# Patient Record
Sex: MALE | Race: WHITE
[De-identification: names, ages, dates, MRNs, and addresses within clinical notes are randomized per-mention and may not be internally consistent; named-entity substitution may affect disease eponyms.]

---

## 2017-09-29 ENCOUNTER — HOSPITAL ENCOUNTER (EMERGENCY)
Dept: HOSPITAL 10 - FTE | Age: 37
Discharge: HOME | End: 2017-09-29
Payer: SELF-PAY

## 2017-09-29 VITALS
HEIGHT: 72 IN | WEIGHT: 167.55 LBS | HEIGHT: 72 IN | BODY MASS INDEX: 22.69 KG/M2 | BODY MASS INDEX: 22.69 KG/M2 | WEIGHT: 167.55 LBS

## 2017-09-29 DIAGNOSIS — S69.92XA: Primary | ICD-10-CM
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
## 2017-09-29 NOTE — ERA
ER Documentation


Chief Complaint


Date/Time


DATE: 9/29/17 


TIME: 23:11


Chief Complaint


left thumb injury hit w/ a hummer





HPI


37-year-old male presenting one day status post hammer versus left thumb.  

Patient states that the pain is 7 out of 10.  Worse when the pressure is 

applied.  Has taken ibuprofen and acetaminophen without relief.  Has used a hot 

needle for at home do it yourself electrocautery without relief.  Denies any 

other injuries or complaints.  Nursing notes have been reviewed and are 

consistent with history given.





ROS


All systems reviewed and are negative except as per history of present illness.





Medications


Home Meds


Active Scripts


Hydrocodone/Acetaminophen (Norco 5-325 Tablet) 1 Each Tablet, 1 TAB PO Q6H Y 

for PAIN, #7 TAB


   Prov:BRANDI INMAN PA-C         9/29/17


Cephalexin* (Keflex*) 500 Mg Capsule, 500 MG PO QID for 10 Days, CAP


   Prov:ELLI BRUSH DO         10/21/16


Sulfamethoxazole-Trimethoprim* (Bactrim* DS) 800-160 Mg Tab, 1 TAB PO BID for 

10 Days, TAB


   Prov:ELLI BRUSH DO         10/21/16





Allergies


Allergies:  


Coded Allergies:  


     No Known Allergy (Unverified , 10/21/16)





PMhx/Soc


History of Surgery:  Yes (right femur repair)


Anesthesia Reaction:  No


Hx Neurological Disorder:  No


Hx Respiratory Disorders:  No


Hx Cardiac Disorders:  No


Hx Psychiatric Problems:  No


Hx Miscellaneous Medical Probl:  No


Hx Alcohol Use:  Yes (occassional)


Hx Substance Use:  No


Hx Tobacco Use:  No


Smoking Status:  Never smoker





Physical Exam


Vitals





Vital Signs








  Date Time  Temp Pulse Resp B/P Pulse Ox O2 Delivery O2 Flow Rate FiO2


 


9/29/17 21:36 97.5 80 20 139/87 100   








Physical Exam


Const:  []


Head:   Atraumatic 


Eyes:    Normal Conjunctiva


ENT:    Normal External Ears, Nose and Mouth.


Neck:               Full range of motion..~ No meningismus.


Resp:    Clear to auscultation bilaterally


Cardio:    Regular rate and rhythm, no murmurs


Abd:    Soft, non tender, non distended. Normal bowel sounds


Skin:    No petechiae or rashes


Back:    No midline or flank tenderness


Ext:    Ulnar sided subungual hematoma of the left thumb.  Neurovascularly 

intact.  Cap refill less than 2 seconds.


Neur:    Awake and alert


Psych:    Normal Mood and Affect


Results 24 hrs





 Current Medications








 Medications


  (Trade)  Dose


 Ordered  Sig/Steve


 Route


 PRN Reason  Start Time


 Stop Time Status Last Admin


Dose Admin


 


 Lidocaine


  (Xylocaine 1%


  (Mdv) 20 ml)  20 ml  ONCE  ONCE


 SC


   9/29/17 22:30


 9/29/17 22:31 DC  


 


 


 Acetaminophen/


 Hydrocodone Bitart


  (Norco (5/325))  1 tab  ONCE  ONCE


 PO


   9/29/17 22:30


 9/29/17 22:31 DC 9/29/17 22:29


 











Procedures/MDM


37-year-old male presenting one day status post hammer versus left thumb.  

Norco was given in the ED with relief of symptoms. Patient refused x-ray.  

Digital block was achieved with 3 mL of lidocaine without epi.  Electrocautery 

was used at the edge of the nail bed.  Approximately 1 mL of blood was 

evacuated.  Symptoms improved. Most likely diagnosis is subungual hematoma 1 

day status post finger injury.  I have no suspicion for flexor tenosynovitis, 

infection, compartment syndrome, or other neurovascular compromise.  Patient 

will be given Norco at home.  Tetanus status up-to-date.  No antibiotics 

indicated at this time.  I have spoke with the patient regarding their 

condition and future management. They have verbally responded that they 

understand their status and treatment plan. The patients vitals are stable, 

and their current condition is appropriate for discharge. The patient will be 

given discharge instructions with return precautions.


Discharge medication: Norco 5/325 mg p.o. as needed





Departure


Diagnosis:  


 Primary Impression:  


 Finger injury


 Qualified Code:  S69.92XA - Injury of finger of left hand, initial encounter


 Additional Impression:  


 Pain of finger


 Qualified Code:  M79.645 - Pain of finger of left hand


Condition:  Stable





Additional Instructions:  


Follow up with your PCP within the next 1-3 days for a more thorough evaluation 

and a possible referral to a specialist. Return the the emergency department 

immediately if symptoms worsen or change. If you have any questions regarding 

medications, ask your pharmacist or us before you leave. If any adverse 

reactions occur while taking your medications, discontinue the treatment and 

return to the emergency department immediately.  Take your medications as 

directed, and complete the entire course of treatment.











BRANDI INMAN PA-C Sep 29, 2017 23:14

## 2019-07-08 ENCOUNTER — HOSPITAL ENCOUNTER (EMERGENCY)
Dept: HOSPITAL 10 - FTE | Age: 39
Discharge: HOME | End: 2019-07-08
Payer: COMMERCIAL

## 2019-07-08 ENCOUNTER — HOSPITAL ENCOUNTER (EMERGENCY)
Dept: HOSPITAL 91 - FTE | Age: 39
Discharge: HOME | End: 2019-07-08
Payer: COMMERCIAL

## 2019-07-08 VITALS — HEART RATE: 105 BPM | DIASTOLIC BLOOD PRESSURE: 79 MMHG | RESPIRATION RATE: 16 BRPM | SYSTOLIC BLOOD PRESSURE: 159 MMHG

## 2019-07-08 VITALS
HEIGHT: 72 IN | HEIGHT: 72 IN | WEIGHT: 182.76 LBS | BODY MASS INDEX: 24.75 KG/M2 | BODY MASS INDEX: 24.75 KG/M2 | WEIGHT: 182.76 LBS

## 2019-07-08 DIAGNOSIS — M72.2: Primary | ICD-10-CM

## 2019-07-08 PROCEDURE — 99284 EMERGENCY DEPT VISIT MOD MDM: CPT

## 2019-07-08 PROCEDURE — 73630 X-RAY EXAM OF FOOT: CPT

## 2019-07-08 PROCEDURE — 96372 THER/PROPH/DIAG INJ SC/IM: CPT

## 2019-07-08 RX ADMIN — KETOROLAC TROMETHAMINE 1 MG: 30 INJECTION, SOLUTION INTRAMUSCULAR at 10:00

## 2019-07-08 NOTE — ERD
ER Documentation


Chief Complaint


Chief Complaint





left foot pain and swelling x 5 days. unknown cause





HPI


39-year-old male presented to ED for left foot pain x4 days.  Patient states he 


has trouble walking in the morning and states this is happened one other time 


but it was in his other foot and went away.  Patient denies any past medical 


history.  States he is not allergic to any medications.  Patient rates the pain 


a 6 out of 10 and cannot recall any traumatic event or injury that he did to his


foot.





ROS


All systems reviewed and are negative except as per history of present illness.





Medications


Home Meds


Active Scripts


Methylprednisolone* (Medrol* DOSE PACK) 4 Mg/Dose-Pack Tab.ds.pk, 4 MG PO .AS 


DIRECTED for 10 Days, PACKET


   Prov:CORINNE ALEJANDRA PA-C         7/8/19


Hydrocodone/Acetaminophen (Norco 5-325 Tablet) 1 Each Tablet, 1 TAB PO Q6H PRN 


for PAIN, #7 TAB


   Prov:BRANDI INMAN PA-C         9/29/17


Cephalexin* (Keflex*) 500 Mg Capsule, 500 MG PO QID for 10 Days, CAP


   Prov:ELLI BRUSH DO         10/21/16


Sulfamethoxazole-Trimethoprim* (Bactrim* DS) 800-160 Mg Tab, 1 TAB PO BID for 10


Days, TAB


   Prov:ELLI BRUSH DO         10/21/16





Allergies


Allergies:  


Coded Allergies:  


     No Known Allergy (Unverified , 10/21/16)





PMhx/Soc


History of Surgery:  Yes (right femur repair)


Anesthesia Reaction:  No


Hx Neurological Disorder:  No


Hx Respiratory Disorders:  No


Hx Cardiac Disorders:  No


Hx Psychiatric Problems:  No


Hx Miscellaneous Medical Probl:  No


Hx Alcohol Use:  Yes (occassional)


Hx Substance Use:  No


Hx Tobacco Use:  No


Smoking Status:  Never smoker





FmHx


Family History:  No diabetes, No coronary disease, No other





Physical Exam


Vitals





Vital Signs


  Date      Temp  Pulse  Resp  B/P (MAP)   Pulse Ox  O2          O2 Flow    FiO2


Time                                                 Delivery    Rate


    7/8/19  97.8    105    16      159/79        97


     09:24                          (105)





Physical Exam


Const:   No acute distress


Head:   Atraumatic 


Eyes:    Normal Conjunctiva


ENT:    Normal External Ears, Nose and Mouth.


Neck:               Full range of motion. No meningismus.


Resp:   Clear to auscultation bilaterally


Cardio:   Regular rate and rhythm, no murmurs


Abd:    Soft, non tender, non distended. Normal bowel sounds


Skin:   No petechiae or rashes


Back:   No midline or flank tenderness


Ext:    No cyanosis, or edema, left foot is tender to palpation on the anterior 


surface of his foot no signs of deformities, ecchymosis, contusions patient is 


neurovascularly intact he can wiggle his toes he has good sensation and pulses 


are equal in bilateral


Neur:   Awake and alert


Psych:    Normal Mood and Affect


Results 24 hrs





Current Medications


 Medications
   Dose
          Sig/Steve
       Start Time
   Status  Last


 (Trade)       Ordered        Route
 PRN     Stop Time              Admin
Dose


                              Reason                                Admin


 Ketorolac
     30 mg          ONCE  STAT
    7/8/19        DC            7/8/19


Tromethamine
                 IM
            09:51
 7/8/19                10:00



 (Toradol)                                   09:52








Procedures/MDM





Diagnostic imaging:


Read by radiologist 


PROCEDURE:   XR Foot. 


 


CLINICAL INDICATION:   Pain 


 


TECHNIQUE:   AP, lateral and oblique views of the left foot was obtained.  The 


images were reviewed on a PACS workstation. 


 


COMPARISON:   None. 


 


FINDINGS:


 


The bones of the foot appear intact, with no evidence of fracture, dislocation, 


or subluxation. 


The joint spaces are preserved.


The bone mineralization is normal. 


No significant soft tissue swelling is seen. 


 


RPTAT: AA


IMPRESSION:


Unremarkable left foot radiographs.


_____________________________________________ 


.Tomás Shannon MD, MD           Date    Time 


Electronically viewed and signed by .Tomás Shannon MD, MD on 07/08/2019 


10:49











Medications given in ER:


Toradol





Patient tolerated medication well with no adverse reactions.  Patient reported 


improvement in pain.





Medical decision making:


Is a 39-year-old male presented to ED for left foot pain.  Physical exam showed 


mild pain to palpation on the anterior aspect of his left foot.  No signs of 


deformity contusions or crepitus.  Patient is able to ambulate with pain.  


Patient presented to the ER with his own crutches.  Patient states this happened


one other time in his right foot and eventually went away.  Patient remained 


neurovascular intact he was sent for x-rays of the left lower extremity which 


indicated no signs of fracture.  The patient reports improvement in pain with a 


Toradol injection.  At this time I have low suspicion for compartment syndrome, 


fracture, neurovascular injury.  The patient has his own crutches and is going 


to use those for departing the ED.  I advised the patient that he needs follow-


up with his primary care provider regarding this visit.  If symptoms worsen I 


told him to return to ER immediately.  Patient is in agreement to treatment plan


had no further questions upon discharge








Prescription for home:


Medrol Dosepak








I have discussed with the patient proper use and  common side effects to expert 


with the medication .  I advised  the patient/family to speak with the 


pharmacist dispensing the medication to be advised of any potential drug 


interactions with other medication or supplements they may be taking. 











Discharge:


At this time, patient is stable for discharge and outpatient management.  I have


instructed the patient to follow-up with his\her primary care physician in 1 to 


2 days.  I have discussed with the patient the possibility of needing to see a 


specialist for further work-up and imaging studies if symptoms persist.  I have 


instructed the patient to promptly return to the ER for any new or worsening 


symptoms including increased pain, fever, nausea, vomiting, weakness or LOC.  


The patient and\or family expressed understanding of and agreement with this 


plan.  All questions were answered.  Home care instructions were provided.








Disclaimer:


Inadvertent spelling and grammatical errors are likely due to EHR\dictation 


software use and do not reflect on the overall quality of patient care.  Also, 


please note that the electronic time recorded on the note does not necessarily 


reflect the actual time of the patient encounter.





Departure


Diagnosis:  


   Primary Impression:  


   Foot pain


   Laterality:  left  Qualified Codes:  M79.672 - Pain in left foot


   Additional Impression:  


   Plantar fasciitis, left


Condition:  Stable


Patient Instructions:  Plantar Fasciitis


Referrals:  


[facility]


YOU HAVE RECEIVED A MEDICAL SCREENING EXAM AND THE RESULTS INDICATE THAT YOU DO 


NOT HAVE A CONDITION THAT REQUIRES URGENT TREATMENT IN THE EMERGENCY DEPARTMENT.





FURTHER EVALUATION AND TREATMENT OF YOUR CONDITION CAN WAIT UNTIL YOU ARE SEEN 


IN YOUR DOCTORS OFFICE WITHIN THE NEXT 1-2 DAYS. IT IS YOUR RESPONSIBILITY TO 


MAKE AN APPOINTMENT FOR FOLOW-UP CARE.





IF YOU HAVE A PRIMARY DOCTOR


--you should call your primary doctor and schedule an appointment





IF YOU DO NOT HAVE A PRIMARY DOCTOR YOU CAN CALL OUR PHYSICIAN REFERRAL HOTLINE 


AT


 [phone] 





IF YOU CAN NOT AFFORD TO SEE A PHYSICIAN YOU CAN CHOSE FROM THE FOLLOWING 


[facility] [phone] [address] [facility] [facility]. [place]





[facility] [phone] [address] [facility]. [facility]





[facility] [phone] [address] [facility] [phone] [address] [facility] [phone] [address] [facility]. [facility] [phone] [address] [facility]. [facility]


YOU HAVE RECEIVED A MEDICAL SCREENING EXAM AND THE RESULTS INDICATE THAT YOU DO 


NOT HAVE A CONDITION THAT REQUIRES URGENT TREATMENT IN THE EMERGENCY DEPARTMENT.





FURTHER EVALUATION AND TREATMENT OF YOUR CONDITION CAN WAIT UNTIL YOU ARE SEEN 


IN YOUR DOCTORS OFFICE WITHIN THE NEXT 1-2 DAYS. IT IS YOUR RESPONSIBILITY TO 


MAKE AN APPOINTMENT FOR FOLOW-UP CARE.





IF YOU HAVE A PRIMARY DOCTOR


--you should call your primary doctor and schedule and appointment





IF YOU DO NOT HAVE A PRIMARY DOCTOR YOU CAN CALL OUR PHYSICIAN REFERRAL HOTLINE 


AT [phone].





IF YOU CAN NOT AFFORD TO SEE A PHYSICIAN YOU CAN CHOSE FROM THE FOLLOWING [facility]


INSTITUTIONS:





[facility]


[address] [place] [address]





[facility]


[address] [place] [address]





[facility]


[address] [place] [address]





Additional Instructions:  


Call your primary care doctor TOMORROW for an appointment during the next 1-2 


days.See the doctor sooner or return here if your condition worsens before your 


appointment time.











CORINNE ALEJANDRA PA-C                Jul 8, 2019 10:55